# Patient Record
(demographics unavailable — no encounter records)

---

## 2025-03-04 NOTE — ASSESSMENT
[FreeTextEntry1] : 77-year-old female here for evaluation of right hip pain.  Patient reports an aching nontraumatic pain that which worsens upon going from standing to standing position going up and down stairs.  Denies any trauma or falls.  He takes Advil over-the-counter without relief.  Able to bear weight and ambulate however experiences pain with doing so.  Of note, she did have a right hip replacement about 20 years ago.     Physical examination right hip: No swelling, ecchymosis, or erythema appreciated.  Skin is intact.  Patient mildly tense palpation along the lateral hip line and the groin area.  Full range of motion upon full flexion, extension, and internal/external rotation.  No weakness is appreciated.  Calf is soft and nontender.  Negative Homans' sign.  Mildly antalgic gait.  Negative straight leg raise.  Sensorimotor intact distally.  Neuro vas intact.     X-rays of right hip taken in the office today reveal acceptable alignment of surgical hardware.  No acute fractures, subluxation, or dislocations are appreciated.       Treatment plan as discussed:   The patient's pain is likely contributed to weakness of the surrounding musculature making up the right hip joint.  X-rays are negative.  No signs of infection.  X-rays were discussed in depth.  Red flag symptoms were discussed.   I recommended anti-inflammatory medication.  Ibuprofen 800 mg sent to patient pharmacy to be taken as needed for pain. Benefits discussed. Confirmed no contraindication to NSAIDs.   I recommended patient rest, ice, compress, and elevate the knee regularly. Encouraged activity modification as tolerable. Encouraged gentle range of motion to avoid stiffness. No gym /sports at least until further evaluation.   Script for physical therapy provided for improved ROM and strengthening of surrounding musculature. Benefits discussed.   All questions and concerns addressed to patient's satisfaction. Patient expresses full understanding of treatment plan.  Patient will follow up in 4-6 weeks with [facility],

## 2025-04-30 NOTE — HISTORY OF PRESENT ILLNESS
[de-identified] : f/u of right hip has pain, unclear if its from back or hip has a hip replacement 20 years ago also with low back issues exam no pain with prom of hip no clunking or instability  Imaging: X-rays were reviewed with the patient.   AP and Lateral views were obtained of the hips, including the contralateral side for comparison purposes. X-rays are negative for acute bone or soft tissue trauma. right hip replacement in good position  a&p seems like most pain coming from back PT and pain mngt eval f/u as needed

## 2025-05-05 NOTE — PHYSICAL EXAM
[de-identified] : L spine   No rashes, erythema, edema noted TTP right lumbar paraspinals and sciatic notch Positive straight leg raise on the right LLE: 5/5 strength RLE: 5/5 strength Sensation intact b/l LE

## 2025-05-05 NOTE — HISTORY OF PRESENT ILLNESS
[FreeTextEntry1] : The patient presents with complaints of low back pain. Pain is located in the midline low back above the waistband and refers to the right low back, the right buttock and down the right posterior and lateral lower leg. The pain is aching and throbbing in the back with occasional sharp stabbing, burning sensation that refer down the right buttock/legs especially with increased activity such as bending forward. The patient also experiences similar symptoms with standing prolonged. THe patients functional status and quality of life has been significantly impacted negatively from the pain and has not responded to conservative measures such as rest, heat, otc pain medications, and activity modifications. Pain is 8/10 at worst. Patient denies any bowel or bladder dysfunction, incontinence, or saddle anesthesia.

## 2025-05-05 NOTE — DISCUSSION/SUMMARY
[de-identified] : Recommendations 1. MRI L spine MRI lumbar spine w/o contrast to evaluate for anatomic changes of the lumbar discs, nerves, and surrounding tissue that will help provide information to accurately diagnose the patient's cause of pain and therefore treat said pain generator in the most effective way possible - whether that be specific physical therapy recommendations, medications, and/or interventional therapies.  2. gabapentin 100mg q8h standing. 30d supply We are going to start gabapentin. Potential side effects were discussed which included dizziness, sedation, and pedal edema to name a few. If the patient cannot tolerate these side effects should they occur, then they will stop the medication. If the patient experiences sedation, they understand that they should not drive. The usage of the medication was discussed and the patient understands that it is an anti-epileptic medication used for neuropathic pain and that the pain relief from this medication will likely be subtle, and that hopefully in combination with the other treatments we are offering we will be able to get some additive relief.   f/u 2 weeks

## 2025-05-05 NOTE — DISCUSSION/SUMMARY
[de-identified] : Recommendations 1. MRI L spine MRI lumbar spine w/o contrast to evaluate for anatomic changes of the lumbar discs, nerves, and surrounding tissue that will help provide information to accurately diagnose the patient's cause of pain and therefore treat said pain generator in the most effective way possible - whether that be specific physical therapy recommendations, medications, and/or interventional therapies.  2. gabapentin 100mg q8h standing. 30d supply We are going to start gabapentin. Potential side effects were discussed which included dizziness, sedation, and pedal edema to name a few. If the patient cannot tolerate these side effects should they occur, then they will stop the medication. If the patient experiences sedation, they understand that they should not drive. The usage of the medication was discussed and the patient understands that it is an anti-epileptic medication used for neuropathic pain and that the pain relief from this medication will likely be subtle, and that hopefully in combination with the other treatments we are offering we will be able to get some additive relief.   f/u 2 weeks

## 2025-05-05 NOTE — PHYSICAL EXAM
[de-identified] : L spine   No rashes, erythema, edema noted TTP right lumbar paraspinals and sciatic notch Positive straight leg raise on the right LLE: 5/5 strength RLE: 5/5 strength Sensation intact b/l LE

## 2025-05-22 NOTE — PHYSICAL EXAM
[de-identified] : L spine   No rashes, erythema, edema noted TTP right lumbar paraspinals and sciatic notch Positive straight leg raise on the right LLE: 5/5 strength RLE: 5/5 strength Sensation intact b/l LE

## 2025-05-22 NOTE — PHYSICAL EXAM
[de-identified] : L spine   No rashes, erythema, edema noted TTP right lumbar paraspinals and sciatic notch Positive straight leg raise on the right LLE: 5/5 strength RLE: 5/5 strength Sensation intact b/l LE

## 2025-05-22 NOTE — HISTORY OF PRESENT ILLNESS
[FreeTextEntry1] : The patient presents with complaints of low back pain. Pain is located in the midline low back above the waistband and refers to the right low back, the right buttock and down the right posterior and lateral lower leg. The pain is aching and throbbing in the back with occasional sharp stabbing, burning sensation that refer down the right buttock/legs especially with increased activity such as bending forward. The patient also experiences similar symptoms with standing prolonged. THe patients functional status and quality of life has been significantly impacted negatively from the pain and has not responded to conservative measures such as rest, heat, otc pain medications, and activity modifications. Pain is 8/10 at worst. Patient denies any bowel or bladder dysfunction, incontinence, or saddle anesthesia.   Encounter revisit (5/21/2025) Patient is 78-year-old female who is here today for a follow options or care for lower back pain with radicular features that going down to her right lower extremity into anterior lateral thigh.  Pain is associated with numbness and tingling as well as sharp shooting pain.  Pain is worse when she stands and walks as well as bending forward, pain is better when she sits down or sleeps at nighttime.  Pain is 8 out of 10 on the pain scale. Patient denies any bowel or bladder dysfunction, incontinence, or saddle anesthesia.  She underwent lumbar MRI which shows  L1 fracture L3-4 right foraminal stenosis with mild spinal stenosis L4-5 grade 2 retrolisthesis with bilateral foraminal stenosis and mild spinal stenosis.

## 2025-05-22 NOTE — DISCUSSION/SUMMARY
[de-identified] : A discussion regarding available pain management treatment options occurred with the patient. These included interventional, rehabilitative, pharmacological, and alternative modalities. We will proceed with the following:   Interventional/ Procedures: 1.  Proceed with a RT L3-L4,L4-5 transforaminal epidural steroid injection.  - Treatment options were discussed. The patient has been having persistent, severe low back pain and lumbar radicular pain that has minimally improved with conservative therapy thus far including physical therapy, home stretching and anti-inflammatory medications. The patient was given the option of proceeding with a lumbar epidural steroid injection to try and get the patient some pain relief. - The risks and benefits were discussed, which included the associated problems with steroids, bleeding, nerve injury, lack of improvement with pain, and 0.5% chance of a post dural puncture headache.   Medication/pharmacological: 1. C/W Gabapentin 100 mg TID  - Potential side effects were discussed which included dizziness, sedation, and pedal edema to name a few. If the patient cannot tolerate these side effects should they occur, then they will stop the medication. If the patient experiences sedation, they understand that they should not drive. The usage of the medication was discussed and the patient understands that it is an anti-epileptic medication used for neuropathic pain and that the pain relief from this medication will likely be subtle, and that hopefully in combination with the other treatments we are offering we will be able to get some additive relief. 2. C/W  mg BID PRN - I advised the patient that the NSAID should be taken with food. In addition, while taking the prescribed NSAID, no over the counter or other NSAIDs should be used, such as ibuprofen (Motrin or Advil) or naproxen (Aleve) as this can cause stomach upset or other side effects. If needed for fever or breakthrough pain Tylenol can be used.   Rehabilitative/alternative modalities: 1. Initiate physician directed activity and lifestyle modifications. 2. Participation in active HEP was discussed and printed. 3. Patient was advised to stay away from any heavy lifting. If needed, they were advised to squat and not bend forward.  Total clinician time spent today on the patient is 30 minutes including preparing to see the patient, obtaining and/or reviewing and confirming history, performing medically necessary and appropriate examination, counseling and educating the patient and/or family, documenting clinical information in the EHR and communicating and/or referring to other healthcare professionals.  F/U in 2 weeks   JERMAINE Freeman, DO

## 2025-07-07 NOTE — PROCEDURE
[FreeTextEntry3] : Date of Service: 07/07/2025 Account: [id number] Patient: CHRISTINA QUINTANA YOB: 1947 Age: 78 year Surgeon: Soren Sarkar DO Assistant: None. Pre-Operative Diagnosis:   Lumbar Radiculopathy Post Operative Diagnosis:  Lumbar Radiculopathy Procedure:  Right L3-L4, L4-L5 transforaminal epidural steroid injection under fluoroscopic guidance. Anesthesia: none   This procedure was carried out using fluoroscopic guidance.  The risks and benefits of the procedure were discussed extensively with the patient.  The consent of the patient was obtained, and the following procedure was performed. The patient was placed in the prone position on the fluoroscopy table and the area was prepped and draped in a sterile fashion.  A timeout was performed with all essential staff present and the site and side were verified.   The right L3-L4 neural foramen was then identified on right oblique "romina dog" anatomical view at the 6 o' clock position using fluoroscopic guidance, and the area was marked.  A 25-gauge 3.5 inch spinal needle was directed toward the inferior (6 o'clock) position of the pedicle, which formed the roof of the identified foramen.  Once in the epidural space, after negative aspiration for heme and CSF, 1cc of Omnipaque contrast was injected to confirm epidural location and assess filling defects and rule out intravascular needle placement.   Lumbar epidurogram showed no intravascular or intrathecal flow pattern.  No blood or CSF was aspirated. Omnipaque spread medially in epidural space and outlined the exiting nerve root.   After this, 2.5 cc of a 5cc mixture of preservative free normal saline plus 10mg of Decadron was injected in the epidural space.   The right L4-L5 neural foramen was then identified on right oblique "romina dog" anatomical view at the 6 o' clock position using fluoroscopic guidance, and the area was marked.  A 25-gauge 3.5-inch spinal needle was directed toward the inferior (6 o'clock) position of the pedicle, which formed the roof of the identified foramen.  Once in the epidural space, after negative aspiration for heme and CSF, 1cc of Omnipaque contrast was injected to confirm epidural location and assess filling defects and rule out intravascular needle placement.   Lumbar epidurogram showed no intravascular or intrathecal flow pattern.  No blood or CSF was aspirated. Omnipaque spread medially in epidural space and outlined the exiting nerve root.   After this, the remainder of the injectate listed above was injected in the epidural space.   The needle was subsequently removed.  Vital signs remained normal.  Pulse oximeter was used throughout the procedure and the patient's pulse and oxygen saturation remained within normal limits.  The patient tolerated the procedure well.  There were no complications.  The patient was instructed to apply ice over the injection sites for twenty minutes every two hours for the next 24 to 48 hours.   Disposition:        1. The patient was advised to F/U in 1-2 weeks to assess the response to the injection.      2. The patient was also instructed to contact me immediately if there were any concerns related to the procedure performed.

## 2025-07-23 NOTE — HISTORY OF PRESENT ILLNESS
[FreeTextEntry1] : The patient presents with complaints of low back pain. Pain is located in the midline low back above the waistband and refers to the right low back, the right buttock and down the right posterior and lateral lower leg. The pain is aching and throbbing in the back with occasional sharp stabbing, burning sensation that refer down the right buttock/legs especially with increased activity such as bending forward. The patient also experiences similar symptoms with standing prolonged. THe patients functional status and quality of life has been significantly impacted negatively from the pain and has not responded to conservative measures such as rest, heat, otc pain medications, and activity modifications. Pain is 8/10 at worst. Patient denies any bowel or bladder dysfunction, incontinence, or saddle anesthesia.   Encounter revisit (5/21/2025) Patient is 78-year-old female who is here today for a follow options or care for lower back pain with radicular features that going down to her right lower extremity into anterior lateral thigh.  Pain is associated with numbness and tingling as well as sharp shooting pain.  Pain is worse when she stands and walks as well as bending forward, pain is better when she sits down or sleeps at nighttime.  Pain is 8 out of 10 on the pain scale. Patient denies any bowel or bladder dysfunction, incontinence, or saddle anesthesia.  She underwent lumbar MRI which shows  L1 fracture L3-4 right foraminal stenosis with mild spinal stenosis L4-5 grade 2 retrolisthesis with bilateral foraminal stenosis and mild spinal stenosis.  7-: Revisit encounter. She is accompanied by her daughter today.   Since her last office visit, she underwent a Right L3-4, L4-5 transforaminal epidural steroid injection on 7-7-2025. She affords little to no relief from this procedure. She continues today with persistent pain in the lower back. The pain is mainly on the right side of the lower back pain radicular feature into the right buttock and into the anterior aspect of the thigh. She describes the pain as sharp, shooting, numbness and tingling. Her pain is made worse with standing or walking. She tends to lose her balance when she walks. She has to use a cane for ambulation. She rates the pain at a 8/10 on the pain scale. Her pain is present daily and limits her ADLs.

## 2025-07-23 NOTE — PHYSICAL EXAM
[de-identified] : L spine   No rashes, erythema, edema noted TTP b/l lumbar paraspinal muscles Positive facet loading bilaterally Positive KEMPS test bilaterally LLE: 5/5 strength RLE: 5/5 strength Sensation intact b/l LE

## 2025-07-23 NOTE — DISCUSSION/SUMMARY
[de-identified] : A discussion regarding available pain management treatment options occurred with the patient. These included interventional, rehabilitative, pharmacological, and alternative modalities. We will proceed with the following:   Interventional treatment options: 1. Proceed with a Bilateral L3, L4, L5 medial branch facet block under fluoroscopic guidance.  Treatment options were discussed with the patient. The patient has been having persistent axial low back pain that has minimally improved with conservative therapy.    The patient was given the option to proceed with a lumbar medial branch block, which would be potentially both diagnostic and therapeutic. If the patient has a positive response to the local anesthetic, With two positive responses we can proceed with a radiofrequency ablation of the lumbar medial branch nerves for potential long term pain relief. If the patient does not get relief we can consider other options.    The risks and benefits were discussed which included bleeding, infection, nerve injury, no pain relief or worse, increased pain, intrathecal injection, and the side effects of steroids.  All questions were answered to the patient's satisfaction.  Complementary treatment options: - lifestyle modifications discussed - avoid bending and bend with knees - avoid heavy lifting   - Follow up 2 weeks post injection.   I Francy Barrientos attest that this documentation has been prepared under the direction and in the presence of provider Dr. Soren Sarkar.  The documentation recorded by the scribe in my presence, accurately reflects the service I personally performed, and the decisions made by me with my edits as appropriate.   Soren Sarkar, DO